# Patient Record
Sex: FEMALE | URBAN - METROPOLITAN AREA
[De-identification: names, ages, dates, MRNs, and addresses within clinical notes are randomized per-mention and may not be internally consistent; named-entity substitution may affect disease eponyms.]

---

## 2022-03-16 ENCOUNTER — APPOINTMENT (OUTPATIENT)
Dept: URBAN - METROPOLITAN AREA CLINIC 265 | Age: 52
Setting detail: DERMATOLOGY
End: 2022-07-11

## 2022-03-16 DIAGNOSIS — L98.8 OTHER SPECIFIED DISORDERS OF THE SKIN AND SUBCUTANEOUS TISSUE: ICD-10-CM

## 2022-03-16 PROCEDURE — OTHER BOTOX: OTHER

## 2022-03-30 ENCOUNTER — APPOINTMENT (OUTPATIENT)
Dept: URBAN - METROPOLITAN AREA CLINIC 265 | Age: 52
Setting detail: DERMATOLOGY
End: 2022-07-11

## 2022-03-30 DIAGNOSIS — L98.8 OTHER SPECIFIED DISORDERS OF THE SKIN AND SUBCUTANEOUS TISSUE: ICD-10-CM

## 2022-03-30 PROCEDURE — OTHER BOTOX: OTHER

## 2022-03-30 ASSESSMENT — LOCATION DETAILED DESCRIPTION DERM: LOCATION DETAILED: RIGHT INFERIOR LATERAL FOREHEAD

## 2022-03-30 ASSESSMENT — LOCATION ZONE DERM: LOCATION ZONE: FACE

## 2022-03-30 ASSESSMENT — LOCATION SIMPLE DESCRIPTION DERM: LOCATION SIMPLE: RIGHT FOREHEAD

## 2022-04-15 ENCOUNTER — APPOINTMENT (OUTPATIENT)
Dept: URBAN - METROPOLITAN AREA CLINIC 265 | Age: 52
Setting detail: DERMATOLOGY
End: 2022-07-11

## 2022-04-15 DIAGNOSIS — L90.8 OTHER ATROPHIC DISORDERS OF SKIN: ICD-10-CM

## 2022-04-15 PROCEDURE — OTHER FILLERS: OTHER

## 2022-11-21 ENCOUNTER — APPOINTMENT (OUTPATIENT)
Dept: URBAN - METROPOLITAN AREA CLINIC 265 | Age: 52
Setting detail: DERMATOLOGY
End: 2022-11-21

## 2022-11-21 DIAGNOSIS — L98.8 OTHER SPECIFIED DISORDERS OF THE SKIN AND SUBCUTANEOUS TISSUE: ICD-10-CM

## 2022-11-21 PROCEDURE — OTHER BOTOX: OTHER

## 2023-05-10 ENCOUNTER — APPOINTMENT (OUTPATIENT)
Dept: URBAN - METROPOLITAN AREA CLINIC 265 | Age: 53
Setting detail: DERMATOLOGY
End: 2023-05-10

## 2023-05-10 DIAGNOSIS — L98.8 OTHER SPECIFIED DISORDERS OF THE SKIN AND SUBCUTANEOUS TISSUE: ICD-10-CM

## 2023-05-10 PROCEDURE — OTHER ADDITIONAL NOTES: OTHER

## 2023-05-10 NOTE — PROCEDURE: ADDITIONAL NOTES
Additional Notes: [employer] 32 units. Lot number number [id number]. Exp 05/2025 Additional Notes: [employer] 32 units. Lot number number [id number]. Exp 05/2025

## 2023-10-11 ENCOUNTER — APPOINTMENT (OUTPATIENT)
Dept: URBAN - METROPOLITAN AREA CLINIC 298 | Age: 53
Setting detail: DERMATOLOGY
End: 2023-10-13

## 2023-10-11 DIAGNOSIS — L98.8 OTHER SPECIFIED DISORDERS OF THE SKIN AND SUBCUTANEOUS TISSUE: ICD-10-CM

## 2023-10-11 PROCEDURE — OTHER JEUVEAU: OTHER

## 2023-10-11 NOTE — PROCEDURE: JEUVEAU
Show [place] Units: Yes
[facility] Units: 0
Show Right And Left Periorbital Units: No
Additional Area 1 Location: upper lip
Glabellar Complex Units: 20
Consent: Written consent obtained. Risks include but not limited to lid/brow ptosis, bruising, swelling, diplopia, temporary effect, incomplete chemical denervation.
Show Inventory Tab: Show
Dilution (U/0.1 Cc): 4
Additional Area 2 Location: chin
Post-Care Instructions: Patient instructed to not lie down for 4 hours and limit physical activity for 24 hours.
Forehead Units: 12
Detail Level: Detailed

## 2024-06-25 ENCOUNTER — APPOINTMENT (OUTPATIENT)
Dept: URBAN - METROPOLITAN AREA CLINIC 298 | Age: 54
Setting detail: DERMATOLOGY
End: 2024-06-25

## 2024-06-25 DIAGNOSIS — L98.8 OTHER SPECIFIED DISORDERS OF THE SKIN AND SUBCUTANEOUS TISSUE: ICD-10-CM

## 2024-06-25 PROCEDURE — OTHER DYSPORT: OTHER

## 2024-06-25 NOTE — PROCEDURE: DYSPORT
Additional Area 2 Location: chin
Show Nasal Units: Yes
L Brow Units: 0
Post-Care Instructions: Patient instructed to not lie down for 4 hours and limit physical activity for 24 hours.
Consent: Written consent obtained. Risks include but not limited to lid/brow ptosis, bruising, swelling, diplopia, temporary effect, incomplete chemical denervation.
Show Right And Left Periorbital Units: No
Forehead Units: 20
Detail Level: Detailed
Glabellar Complex Units: 50
Dilution (U/0.1 Cc): 10
Additional Area 1 Location: upper lip
Show Inventory Tab: Show

## 2024-12-30 ENCOUNTER — APPOINTMENT (OUTPATIENT)
Dept: URBAN - METROPOLITAN AREA CLINIC 298 | Age: 54
Setting detail: DERMATOLOGY
End: 2024-12-31

## 2024-12-30 DIAGNOSIS — L90.8 OTHER ATROPHIC DISORDERS OF SKIN: ICD-10-CM

## 2024-12-30 DIAGNOSIS — L98.8 OTHER SPECIFIED DISORDERS OF THE SKIN AND SUBCUTANEOUS TISSUE: ICD-10-CM

## 2024-12-30 PROCEDURE — OTHER FILLERS: OTHER

## 2024-12-30 PROCEDURE — OTHER DYSPORT: OTHER

## 2024-12-30 NOTE — PROCEDURE: FILLERS
Marionette Lines Filler Volume In Cc: 0
Inventory Information: This plan will send filler information to inventory based on the fillers you select. Multiple fillers can be sent but you must ensure you select the appropriate fillers in the inventory tab.
Include Cannula Information In Note?: No
Detail Level: Detailed
Anesthesia Volume In Cc: 0.5
Number Of Syringes (Required For Inventory): 1
Show Inventory Tab: Show
Topical Anesthesia?: 23% lidocaine, 7% tetracaine
Additional Area 1 Location: chin
Filler: Restylane Contour
Additional Area 2 Location: earlobes
Map Statment: See Attach Map for Complete Details
Consent: Written consent obtained. Risks include but not limited to bruising, beading, irregular texture, ulceration, infection, allergic reaction, scar formation, incomplete augmentation, temporary nature, procedural pain.
Post-Care Instructions: Patient instructed to apply ice to reduce swelling.
Anesthesia Type: 1% lidocaine with epinephrine

## 2024-12-30 NOTE — PROCEDURE: DYSPORT
Depressor Anguli Oris Units: 0
Show Nasal Units: Yes
Additional Area 2 Location: chin
Detail Level: Detailed
Forehead Units: 20
Show Mentalis Units: No
Glabellar Complex Units: 50
Show Inventory Tab: Show
Additional Area 1 Location: upper lip
Dilution (U/0.1 Cc): 10
Consent: Written consent obtained. Risks include but not limited to lid/brow ptosis, bruising, swelling, diplopia, temporary effect, incomplete chemical denervation.
Post-Care Instructions: Patient instructed to not lie down for 4 hours and limit physical activity for 24 hours.